# Patient Record
Sex: FEMALE | Race: OTHER
[De-identification: names, ages, dates, MRNs, and addresses within clinical notes are randomized per-mention and may not be internally consistent; named-entity substitution may affect disease eponyms.]

---

## 2021-11-02 ENCOUNTER — NURSE TRIAGE (OUTPATIENT)
Dept: OTHER | Facility: CLINIC | Age: 36
End: 2021-11-02

## 2021-11-02 NOTE — TELEPHONE ENCOUNTER
Received call from [place] at [facility] with [employer]. New patient establishing with Jade DUONG.  services required:  Ernesto Hauser (#[id number]). Brief description of triage:  Patient calling to schedule new patient appointment with provider for concerns for vaginal spotting since Sunday-Monday after two positive pregnancy tests at home on Thursday. Triage indicates for patient to see PCP within 24 hours. If no appointments available, go to [facility] for evaluation. Care advice provided, patient verbalizes understanding; denies any other questions or concerns; instructed to call back for any new or worsening symptoms. Writer provided warm transfer to [place] at [facility] for appointment scheduling. Nola was not able to find any new patient appointments. Advised patient to go to [facility] for evaluation within next 24 hours. Assisted patient with nearest [facility] based on her location:  [facility]. Attention Provider: Thank you for allowing me to participate in the care of your patient. The patient was connected to triage in response to information provided to the [facility]. Please do not respond through this encounter as the response is not directed to a shared pool. Reason for Disposition   [1] Intermittent lower abdominal pain (e.g., cramping) AND [2] present > 24 hours    Answer Assessment - Initial Assessment Questions  1. ONSET: \"When did this bleeding start? \"        Sunday-Monday    2. DESCRIPTION: \"Describe the bleeding that you are having. \" \"How much bleeding is there? \"     - SPOTTING: spotting, or pinkish / brownish mucous discharge; does not fill panti-liner or pad     - MILD:  less than 1 pad / hour; less than patient's usual menstrual bleeding    - MODERATE: 1-2 pads / hour; small-medium blood clots (e.g., pea, grape, small coin)     - SEVERE: soaking 2 or more pads/hour for 2 or more hours; bleeding not contained by pads or continuous red blood from vagina; large blood clots (e.g., golf ball, large coin)       Spotting:  Dark brown to red, noting blood in the toilet when she voids. Panty liner:  Enough to make liner wet but not enough like a period. Has changed three times. On Sunday-Monday:  Dark brown-red, sticky. Since then it's been red discharge. 3. ABDOMINAL PAIN SEVERITY: If present, ask: \"How bad is it? \"  (e.g., Scale 1-10; mild, moderate, or severe)    - MILD (1-3): doesn't interfere with normal activities, abdomen soft and not tender to touch     - MODERATE (4-7): interferes with normal activities or awakens from sleep, tender to touch     - SEVERE (8-10): excruciating pain, doubled over, unable to do any normal activities      \"hard and heaviness\":  2/10, pressure. 4. PREGNANCY: \"Do you know how many weeks or months pregnant you are?\" \"When was the first day of your last normal menstrual period? \"      LMP 09/16/2021-09/18/2021. Had sex on 09/28/2021.    5. HEMODYNAMIC STATUS: \"Are you weak or feeling lightheaded? \" If so, ask: \"Can you stand and walk normally? \"       None    6. OTHER SYMPTOMS: \"What other symptoms are you having with the bleeding? \" (e.g., passed tissue, vaginal discharge, fever, menstrual-type cramps)      no    Protocols used: PREGNANCY - VAGINAL BLEEDING LESS THAN 20 WEEKS EGA-ADULT-AH